# Patient Record
(demographics unavailable — no encounter records)

---

## 2025-07-28 NOTE — ASSESSMENT
[FreeTextEntry1] : Advised that the fracture should remodel and the elbow should have full functionality with no pain. Continue with immobilization in cast for 3 weeks. Cast off and new x-rays at that time.

## 2025-07-28 NOTE — DATA REVIEWED
[de-identified] :  3 views of right elbow reviewed. Right olecranon fracture is in good alignment. Proximal radius fracture is angulated by about 13 degrees.

## 2025-07-28 NOTE — HISTORY OF PRESENT ILLNESS
[FreeTextEntry1] : 8 y/o male here with fracture of the right olecranon and displaced proximal radius fracture after playing basketball 10 days ago. Patient had x-rays at [facility] and was immobilized in a cast.

## 2025-07-28 NOTE — DATA REVIEWED
[de-identified] :  3 views of right elbow reviewed. Right olecranon fracture is in good alignment. Proximal radius fracture is angulated by about 13 degrees.

## 2025-07-28 NOTE — HISTORY OF PRESENT ILLNESS
[FreeTextEntry1] : 6 y/o male here with fracture of the right olecranon and displaced proximal radius fracture after playing basketball 10 days ago. Patient had x-rays at [facility] and was immobilized in a cast.